# Patient Record
Sex: FEMALE | Race: WHITE | NOT HISPANIC OR LATINO | Employment: FULL TIME | URBAN - METROPOLITAN AREA
[De-identification: names, ages, dates, MRNs, and addresses within clinical notes are randomized per-mention and may not be internally consistent; named-entity substitution may affect disease eponyms.]

---

## 2020-12-03 ENCOUNTER — VIRTUAL VISIT (OUTPATIENT)
Dept: NEUROSURGERY | Facility: CLINIC | Age: 70
End: 2020-12-03
Attending: PSYCHIATRY & NEUROLOGY

## 2020-12-03 VITALS — BODY MASS INDEX: 20.77 KG/M2 | HEIGHT: 61 IN | WEIGHT: 110 LBS

## 2020-12-03 DIAGNOSIS — G43.109 MIGRAINE WITH AURA AND WITHOUT STATUS MIGRAINOSUS, NOT INTRACTABLE: ICD-10-CM

## 2020-12-03 PROCEDURE — 99203 OFFICE O/P NEW LOW 30 MIN: CPT | Mod: 95 | Performed by: PSYCHIATRY & NEUROLOGY

## 2020-12-03 RX ORDER — ERGOTAMINE TARTRATE AND CAFFEINE 1; 100 MG/1; MG/1
1 TABLET, FILM COATED ORAL
Qty: 30 TABLET | Refills: 3 | Status: SHIPPED | OUTPATIENT
Start: 2020-12-03 | End: 2021-02-03

## 2020-12-03 RX ORDER — CEFUROXIME AXETIL 250 MG/1
6 TABLET ORAL
Qty: 2 KIT | Refills: 3 | Status: SHIPPED | OUTPATIENT
Start: 2020-12-03 | End: 2021-02-03

## 2020-12-03 RX ORDER — SUMATRIPTAN 100 MG/1
100 TABLET, FILM COATED ORAL
Qty: 30 TABLET | Refills: 11 | Status: SHIPPED | OUTPATIENT
Start: 2020-12-03 | End: 2021-01-02

## 2020-12-03 ASSESSMENT — PAIN SCALES - GENERAL: PAINLEVEL: NO PAIN (0)

## 2020-12-03 ASSESSMENT — MIFFLIN-ST. JEOR: SCORE: 956.34

## 2020-12-03 NOTE — LETTER
12/3/2020         RE: Rebecca Wilson  [address]  [facility] [address]        Dear Colleague,    Thank you for referring your patient, Rebecca Wilson, to the [facility] [place]. Please see a copy of my visit note below.        [facility]  Rebecca Wilson MRN# [id number]   Age: 70 year old YOB: 1950     Requesting physician: No ref. provider found  Marquita Razo            Assessment and Plan:   Assessment:   1.  Migraine Headache disorderpain  2.  Left ankle pain  3.  History of left SI joint      Plan:  1.  Continue same medications  -Ergotamine  -Sumatriptan injections and tablets  2.  Trial of rest for left ankle    At this time we will continue her prescriptions for the sumatriptan both orally and injectable as well as renewing her oral ergotamine.  I will continue to follow her for her vascular headache disorder.  We will treat her left ankle conservatively for now and see if her symptoms resolve implicating the ankle or if they persist implicating her back.  She will call for follow-up.             History of Present Illness:   Ms. Wilson is a 70-year-old female I have followed for nearly 40 years for evaluation and management of an intractable migraine disorder.    Owing to COVID-19, this visit was conducted virtually using Doximity.  Ms. Wilson is aware of the limitations of this form of medical review and agreed to the visit in this nature.      Her history of migraine dates back to early childhood.  Her evaluation has included serial examinations which are normal, brain imaging which is normal, and observation.  Over the years we have tried each and every preventative agent available including but not limited to antidepressants, antiepileptic agents, vascular medications, and Botox.  We have had some success with episodic intervention with sumatriptan as well as ergotamine.  At times her headaches were so disruptive that  "management with narcotic analgesics and IV Depakote was administered in the emergency department.    Over the past decade, her headache disorder has seemingly calmed but she continues to experience frequent migraine aura which have not treated with sumatriptan either orally or injectable he will soon evolve into a severe disabling headache.    At present, she describes an aura of an unusual sensation on the left side of her face followed by disruption in her vision which she terms \"aura\" described as visual blurring initially in the left eye and occasionally traversing to the right as well.  Would be at that point that she would take either the sumatriptan or ergotamine when available to abort the headache.  She would be relatively free of discomfort but feels extensive fatigue for the next several hours and will by enlarge be down for the rest of the day.  She has not required any narcotic analgesics or emergency room visits for many years.    She also has a past history of significant left-sided lower back discomfort with episodic radicular complaints radiating into the left leg.  Investigation in the past has disclose this is likely representative of SI joint strain.  She indicates that this has been stable over the years however more recently has developed some left ankle pain after walking extended distances and wonders if this might be caused by her previous back difficulties.  She is not aware of any change in her back discomfort and is otherwise unaware of any radicular symptoms rating from the back into the leg.  The discomfort seems localized to the ankle which is tender to touch and accompanied by tightness in the left calf.  It does not seem to be prominent with bending or twisting or coughing or sneezing and is not associated with any weakness or any other neurological symptoms.    The balance of her neurologic review of systems is noncontributory.    Her general health is excellent her medical review " of systems as noted below.  She is about to undergo biopsy of a lesion on her left thigh.                 Physical Exam:   General Appearance:  Neurological Examination:  Cognition and Orientation: The patient is oriented to person, place and self. Normal attention and recall. No aphasia or dysarthria  General Motor Survey: The patient has normal muscle bulk, tone and strength in all four extremities. No tremor present.    Gait: Normal gait. Romberg negative. Able to walk on toes, heels and tandem walk           Data:   All laboratory data reviewed  All imaging studies reviewed by me             DATA for DOCUMENTATION:         Past Medical History:   There is no problem list on file for this patient.    Past Medical History:   Diagnosis Date     Diverticulitis      Migraine        Also see scanned health assessment forms.       Past Surgical History:     Past Surgical History:   Procedure Laterality Date     APPENDECTOMY        SECTION       COLONOSCOPY  2013    Procedure: COLONOSCOPY;  COLONOSCOPY;  Surgeon: Lali Bennett MD;  Location: [facility]     deviated septum[              Social History:     Social History     Socioeconomic History     Marital status:      Spouse name: Not on file     Number of children: Not on file     Years of education: Not on file     Highest education level: Not on file   Occupational History     Not on file   Social Needs     Financial resource strain: Not on file     Food insecurity     Worry: Not on file     Inability: Not on file     Transportation needs     Medical: Not on file     Non-medical: Not on file   Tobacco Use     Smoking status: Never Smoker     Smokeless tobacco: Never Used   Substance and Sexual Activity     Alcohol use: Yes     Comment: 2/week     Drug use: No     Sexual activity: Not on file   Lifestyle     Physical activity     Days per week: Not on file     Minutes per session: Not on file     Stress: Not on file   Relationships     Social  connections     Talks on phone: Not on file     Gets together: Not on file     Attends Buddhist service: Not on file     Active member of club or organization: Not on file     Attends meetings of clubs or organizations: Not on file     Relationship status: Not on file     Intimate partner violence     Fear of current or ex partner: Not on file     Emotionally abused: Not on file     Physically abused: Not on file     Forced sexual activity: Not on file   Other Topics Concern     Parent/sibling w/ CABG, MI or angioplasty before 65F 55M? Not Asked   Social History Narrative     Not on file              Family History:   No family history on file.         Medications:     Current Outpatient Medications   Medication Sig     ergotamine-caffeine (CAFERGOT) 2-100 MG per suppository Place 1 suppository rectally as needed for migraine     SUMAtriptan Succinate (IMITREX PO) Take 100 mg by mouth. Indications: Migraine Headache     oxyCODONE-acetaminophen (PERCOCET) 5-325 MG per tablet Take 1-2 tablets by mouth every 4 hours as needed for pain. (Patient not taking: Reported on 12/3/2020)     No current facility-administered medications for this visit.               Review of Systems:   A comprehensive 10 point review of systems (constitutional, ENT, cardiac, peripheral vascular, lymphatic, respiratory, GI, , Musculoskeletal, skin, Neurological) was performed and found to be negative except as described in this note.     See intake form completed by patient      Again, thank you for allowing me to participate in the care of your patient.        Sincerely,        Flex Sparrow MD, MD

## 2020-12-03 NOTE — NURSING NOTE
"Rebecca Wilson is a 70 year old female who is being evaluated via a billable telephone visit.      The patient has been notified of following:     \"This telephone visit will be conducted via a call between you and your physician/provider. We have found that certain health care needs can be provided without the need for a physical exam.  This service lets us provide the care you need with a short phone conversation.  If a prescription is necessary we can send it directly to your pharmacy.  If lab work is needed we can place an order for that and you can then stop by our lab to have the test done at a later time.    Telephone visits are billed at different rates depending on your insurance coverage. During this emergency period, for some insurers they may be billed the same as an in-person visit.  Please reach out to your insurance provider with any questions.    If during the course of the call the physician/provider feels a telephone visit is not appropriate, you will not be charged for this service.\"    Patient has given verbal consent for Telephone visit?  Yes    What phone number would you like to be contacted at? [phone]    How would you like to obtain your AVS? Benjamin Arrington MA on 12/3/2020 at 8:07 AM      "

## 2020-12-03 NOTE — PROGRESS NOTES
[facility]  Rebecca Wilson MRN# [id number]   Age: 70 year old YOB: 1950     Requesting physician: No ref. provider found  Marquita Razo            Assessment and Plan:   Assessment:   1.  Migraine Headache disorderpain  2.  Left ankle pain  3.  History of left SI joint      Plan:  1.  Continue same medications  -Ergotamine  -Sumatriptan injections and tablets  2.  Trial of rest for left ankle    At this time we will continue her prescriptions for the sumatriptan both orally and injectable as well as renewing her oral ergotamine.  I will continue to follow her for her vascular headache disorder.  We will treat her left ankle conservatively for now and see if her symptoms resolve implicating the ankle or if they persist implicating her back.  She will call for follow-up.             History of Present Illness:   Ms. Wilson is a 70-year-old female I have followed for nearly 40 years for evaluation and management of an intractable migraine disorder.    Owing to COVID-19, this visit was conducted virtually using Doximity.  Ms. Wilson is aware of the limitations of this form of medical review and agreed to the visit in this nature.      Her history of migraine dates back to early childhood.  Her evaluation has included serial examinations which are normal, brain imaging which is normal, and observation.  Over the years we have tried each and every preventative agent available including but not limited to antidepressants, antiepileptic agents, vascular medications, and Botox.  We have had some success with episodic intervention with sumatriptan as well as ergotamine.  At times her headaches were so disruptive that management with narcotic analgesics and IV Depakote was administered in the emergency department.    Over the past decade, her headache disorder has seemingly calmed but she continues to experience frequent migraine aura which have not treated with sumatriptan either orally or  "injectable he will soon evolve into a severe disabling headache.    At present, she describes an aura of an unusual sensation on the left side of her face followed by disruption in her vision which she terms \"aura\" described as visual blurring initially in the left eye and occasionally traversing to the right as well.  Would be at that point that she would take either the sumatriptan or ergotamine when available to abort the headache.  She would be relatively free of discomfort but feels extensive fatigue for the next several hours and will by enlarge be down for the rest of the day.  She has not required any narcotic analgesics or emergency room visits for many years.    She also has a past history of significant left-sided lower back discomfort with episodic radicular complaints radiating into the left leg.  Investigation in the past has disclose this is likely representative of SI joint strain.  She indicates that this has been stable over the years however more recently has developed some left ankle pain after walking extended distances and wonders if this might be caused by her previous back difficulties.  She is not aware of any change in her back discomfort and is otherwise unaware of any radicular symptoms rating from the back into the leg.  The discomfort seems localized to the ankle which is tender to touch and accompanied by tightness in the left calf.  It does not seem to be prominent with bending or twisting or coughing or sneezing and is not associated with any weakness or any other neurological symptoms.    The balance of her neurologic review of systems is noncontributory.    Her general health is excellent her medical review of systems as noted below.  She is about to undergo biopsy of a lesion on her left thigh.                 Physical Exam:   General Appearance:  Neurological Examination:  Cognition and Orientation: The patient is oriented to person, place and self. Normal attention and recall. " No aphasia or dysarthria  General Motor Survey: The patient has normal muscle bulk, tone and strength in all four extremities. No tremor present.    Gait: Normal gait. Romberg negative. Able to walk on toes, heels and tandem walk           Data:   All laboratory data reviewed  All imaging studies reviewed by me             DATA for DOCUMENTATION:         Past Medical History:   There is no problem list on file for this patient.    Past Medical History:   Diagnosis Date     Diverticulitis      Migraine        Also see scanned health assessment forms.       Past Surgical History:     Past Surgical History:   Procedure Laterality Date     APPENDECTOMY        SECTION       COLONOSCOPY  2013    Procedure: COLONOSCOPY;  COLONOSCOPY;  Surgeon: Lali Bennett MD;  Location: [facility]     deviated septum[              Social History:     Social History     Socioeconomic History     Marital status:      Spouse name: Not on file     Number of children: Not on file     Years of education: Not on file     Highest education level: Not on file   Occupational History     Not on file   Social Needs     Financial resource strain: Not on file     Food insecurity     Worry: Not on file     Inability: Not on file     Transportation needs     Medical: Not on file     Non-medical: Not on file   Tobacco Use     Smoking status: Never Smoker     Smokeless tobacco: Never Used   Substance and Sexual Activity     Alcohol use: Yes     Comment: 2/week     Drug use: No     Sexual activity: Not on file   Lifestyle     Physical activity     Days per week: Not on file     Minutes per session: Not on file     Stress: Not on file   Relationships     Social connections     Talks on phone: Not on file     Gets together: Not on file     Attends Yazidi service: Not on file     Active member of club or organization: Not on file     Attends meetings of clubs or organizations: Not on file     Relationship status: Not on file      Intimate partner violence     Fear of current or ex partner: Not on file     Emotionally abused: Not on file     Physically abused: Not on file     Forced sexual activity: Not on file   Other Topics Concern     Parent/sibling w/ CABG, MI or angioplasty before 65F 55M? Not Asked   Social History Narrative     Not on file              Family History:   No family history on file.         Medications:     Current Outpatient Medications   Medication Sig     ergotamine-caffeine (CAFERGOT) 2-100 MG per suppository Place 1 suppository rectally as needed for migraine     SUMAtriptan Succinate (IMITREX PO) Take 100 mg by mouth. Indications: Migraine Headache     oxyCODONE-acetaminophen (PERCOCET) 5-325 MG per tablet Take 1-2 tablets by mouth every 4 hours as needed for pain. (Patient not taking: Reported on 12/3/2020)     No current facility-administered medications for this visit.               Review of Systems:   A comprehensive 10 point review of systems (constitutional, ENT, cardiac, peripheral vascular, lymphatic, respiratory, GI, , Musculoskeletal, skin, Neurological) was performed and found to be negative except as described in this note.     See intake form completed by patient

## 2021-05-06 DIAGNOSIS — G43.109 MIGRAINE WITH AURA AND WITHOUT STATUS MIGRAINOSUS, NOT INTRACTABLE: Primary | ICD-10-CM

## 2021-05-06 RX ORDER — SUMATRIPTAN 6 MG/.5ML
6 INJECTION, SOLUTION SUBCUTANEOUS ONCE
Qty: 0.5 ML | Refills: 11 | Status: SHIPPED | OUTPATIENT
Start: 2021-05-06 | End: 2021-05-06

## 2021-07-15 DIAGNOSIS — G43.109 MIGRAINE WITH AURA AND WITHOUT STATUS MIGRAINOSUS, NOT INTRACTABLE: Primary | ICD-10-CM

## 2021-07-15 RX ORDER — SUMATRIPTAN 6 MG/.5ML
6 INJECTION, SOLUTION SUBCUTANEOUS
Qty: 0.5 ML | Refills: 11 | Status: SHIPPED | OUTPATIENT
Start: 2021-07-15 | End: 2021-08-15

## 2021-07-15 NOTE — PROGRESS NOTES
Renewing subcutaneous Imitrex - 10 doses of 6 mg (0.5 ml's) per month x 12 months.  Called [facility] after sending RX via e-prescribe and was informed that the order came through and would be filled.

## 2021-09-09 ENCOUNTER — TELEPHONE (OUTPATIENT)
Dept: NEUROLOGY | Facility: CLINIC | Age: 71
End: 2021-09-09
Payer: COMMERCIAL

## 2021-09-09 DIAGNOSIS — G43.109 MIGRAINE WITH AURA AND WITHOUT STATUS MIGRAINOSUS, NOT INTRACTABLE: Primary | ICD-10-CM

## 2021-09-09 PROCEDURE — 99207 PR NO BILLABLE SERVICE THIS VISIT: CPT | Mod: 25 | Performed by: PSYCHIATRY & NEUROLOGY

## 2021-09-09 NOTE — TELEPHONE ENCOUNTER
Spoke with Ms Wilson about worsening headaches and increasing frequency and duration of auras.  We have had relative stability with declining frequency of these issues for many years and before initiating additional treatment will obtain updated imaging and then review with her.

## 2021-09-19 ENCOUNTER — HEALTH MAINTENANCE LETTER (OUTPATIENT)
Age: 71
End: 2021-09-19

## 2021-10-17 ENCOUNTER — HOSPITAL ENCOUNTER (OUTPATIENT)
Dept: MRI IMAGING | Facility: CLINIC | Age: 71
Discharge: HOME | End: 2021-10-17
Attending: PSYCHIATRY & NEUROLOGY | Admitting: PSYCHIATRY & NEUROLOGY
Payer: COMMERCIAL

## 2021-10-17 DIAGNOSIS — G43.109 MIGRAINE WITH AURA AND WITHOUT STATUS MIGRAINOSUS, NOT INTRACTABLE: ICD-10-CM

## 2021-10-17 PROCEDURE — 70551 MRI BRAIN STEM W/O DYE: CPT

## 2022-01-10 ENCOUNTER — TELEPHONE (OUTPATIENT)
Dept: NEUROLOGY | Facility: CLINIC | Age: 72
End: 2022-01-10
Payer: COMMERCIAL

## 2022-01-10 NOTE — TELEPHONE ENCOUNTER
Last Visit:  9/9/21    Next Visit: TBD    Name of Provider:  Dr Sparrow    Assessment:    [facility] pharmacy is calling to update Dr Sparrow on the excessive refills.     -ergotamine/caffeine  Ordered 11/20/21  #30 plus 1 refill.      Filled in November and December  -sumatriptan oral  #24 tabs plus 5 refills         Filled and has used 2 refills  -sumatriptan injection  Ordered 7/15/21   1 month plus 12            refills       Refilled x 6

## 2022-10-20 DIAGNOSIS — G43.109 MIGRAINE WITH AURA AND WITHOUT STATUS MIGRAINOSUS, NOT INTRACTABLE: Primary | ICD-10-CM

## 2022-10-20 RX ORDER — SUMATRIPTAN 50 MG/1
100 TABLET, FILM COATED ORAL
Qty: 30 TABLET | Refills: 11 | Status: SHIPPED | OUTPATIENT
Start: 2022-10-20 | End: 2022-11-19

## 2022-10-20 RX ORDER — CEFUROXIME AXETIL 250 MG/1
6 TABLET ORAL
Qty: 6 ML | Refills: 11 | Status: SHIPPED | OUTPATIENT
Start: 2022-10-20 | End: 2022-11-19

## 2022-10-20 RX ORDER — SUMATRIPTAN 50 MG/1
100 TABLET, FILM COATED ORAL
Qty: 9 TABLET | Refills: 11 | Status: SHIPPED | OUTPATIENT
Start: 2022-10-20 | End: 2022-10-20

## 2022-11-20 ENCOUNTER — HEALTH MAINTENANCE LETTER (OUTPATIENT)
Age: 72
End: 2022-11-20

## 2022-12-26 ENCOUNTER — TELEPHONE (OUTPATIENT)
Dept: NEUROLOGY | Facility: CLINIC | Age: 72
End: 2022-12-26

## 2022-12-26 NOTE — TELEPHONE ENCOUNTER
Sumatriptan refill request faxed to our clinic. Pt already has 6 refills left and available, contacted pharmacy and medication is ready for pickup. Pt want meds sent to location in [facility] and not [place].        Steph Cadena MA

## 2023-01-19 DIAGNOSIS — G43.109 MIGRAINE WITH AURA AND WITHOUT STATUS MIGRAINOSUS, NOT INTRACTABLE: Primary | ICD-10-CM

## 2023-01-19 RX ORDER — SUMATRIPTAN 100 MG/1
100 TABLET, FILM COATED ORAL
Qty: 24 TABLET | Refills: 11 | Status: SHIPPED | OUTPATIENT
Start: 2023-01-19 | End: 2023-02-19

## 2023-02-02 DIAGNOSIS — G43.109 MIGRAINE WITH AURA AND WITHOUT STATUS MIGRAINOSUS, NOT INTRACTABLE: ICD-10-CM

## 2023-02-02 NOTE — TELEPHONE ENCOUNTER
Pending Prescriptions:                       Disp   Refills    SUMAtriptan (IMITREX) 100 MG tablet       24 tab*11           Sig: Take 1 tablet (100 mg) by mouth at onset of           headache for migraine    Last Appt: 12/03/2020  Next Appt: None

## 2023-02-03 RX ORDER — SUMATRIPTAN 100 MG/1
100 TABLET, FILM COATED ORAL
Qty: 24 TABLET | Refills: 11 | OUTPATIENT
Start: 2023-02-03

## 2023-02-09 ENCOUNTER — VIRTUAL VISIT (OUTPATIENT)
Dept: NEUROLOGY | Facility: CLINIC | Age: 73
End: 2023-02-09
Payer: MEDICARE

## 2023-02-09 VITALS — WEIGHT: 106 LBS | BODY MASS INDEX: 20.03 KG/M2

## 2023-02-09 DIAGNOSIS — M54.2 NECK PAIN: Primary | ICD-10-CM

## 2023-02-09 PROCEDURE — 99441 PR PHYSICIAN TELEPHONE EVALUATION 5-10 MIN: CPT | Mod: 95 | Performed by: PSYCHIATRY & NEUROLOGY

## 2023-02-09 NOTE — LETTER
2/9/2023         RE: Rebecca Wilson  [address]  [facility] [address]        Dear Colleague,    Thank you for referring your patient, Rebecca Wilson, to the [facility]. Please see a copy of my visit note below.        [facility]    Rebecca Wilson MRN# [id number]   Age: 72 year old YOB: 1950     Requesting physician: No ref. provider found  Marquita Razo            Assessment and Plan:   Assessment:  1.  Neck pain - x 2 months - probably musculoskeletal     Plan:  1.  Will refer to PT for trial of mechanical intervention  2.  Call or return if unsuccessful    Rebecca's difficulties appear to be of musculoskeletal origin.  She has profited from the physical therapy which I previously recommended and also suggested that she continue with this until her difficulties have either resolved or she is transferred to a self maintenance exercise program.             History of Present Illness:   CC:  10-minute telephone visit with Ms. Wilson was held today at her request to discuss her difficulties with neck pain.  I have followed her over several decades for issues with migraine headaches which are at present generally well managed with parenteral and oral sumatriptan on an as-needed basis.  Today's discussion however dealt instead with a new symptom, that of bilateral axial neck discomfort.  This emerged shortly after the death of her .  We did have a brief discussion at that time and her symptoms sounded to be more of a musculoskeletal issue than any neurological impairment.  At that time I had suggested she seek consultation with physical therapy.  She did see her therapist, Mr. Aditya Law who has treated her on several occasions with improvement in her difficulty.  We met today via telephone/virtual visit in follow-up.    She continues to experience neck discomfort although finds that the physical therapy interventions have  provided her with some benefit.  The pain is localized to the cervical spine and does not radiate out into the arms.  There are no radicular symptoms no associated weakness, numbness or tingling, change with head movement, cough or sneezing.  The balance of her neurologic review of systems is noncontributory.               Physical Exam:   Not performed via telephone visit.         Data:   All laboratory data reviewed  All imaging studies reviewed by me             DATA for DOCUMENTATION:         Past Medical History:     Patient Active Problem List   Diagnosis     Migraine with aura and without status migrainosus, not intractable     Past Medical History:   Diagnosis Date     Diverticulitis      Migraine        Also see scanned health assessment forms.       Past Surgical History:     Past Surgical History:   Procedure Laterality Date     APPENDECTOMY        SECTION       COLONOSCOPY  2013    Procedure: COLONOSCOPY;  COLONOSCOPY;  Surgeon: Lali Bennett MD;  Location: [facility]     deviated septum[              Social History:     Social History     Socioeconomic History     Marital status:      Spouse name: Not on file     Number of children: Not on file     Years of education: Not on file     Highest education level: Not on file   Occupational History     Not on file   Tobacco Use     Smoking status: Never     Smokeless tobacco: Never   Substance and Sexual Activity     Alcohol use: Yes     Comment: 2/week     Drug use: No     Sexual activity: Not on file   Other Topics Concern     Parent/sibling w/ CABG, MI or angioplasty before 65F 55M? Not Asked   Social History Narrative     Not on file     Social Determinants of Health     Financial Resource Strain: Not on file   Food Insecurity: Not on file   Transportation Needs: Not on file   Physical Activity: Not on file   Stress: Not on file   Social Connections: Not on file   Intimate Partner Violence: Not on file   Housing Stability: Not on  file              Family History:   No family history on file.         Medications:     Current Outpatient Medications   Medication Sig     ergotamine-caffeine (CAFERGOT) 2-100 MG per suppository Place 1 suppository rectally as needed for migraine     SUMAtriptan (IMITREX) 100 MG tablet Take 1 tablet (100 mg) by mouth at onset of headache for migraine     oxyCODONE-acetaminophen (PERCOCET) 5-325 MG per tablet Take 1-2 tablets by mouth every 4 hours as needed for pain. (Patient not taking: Reported on 12/3/2020)     No current facility-administered medications for this visit.              Review of Systems:   A comprehensive 10 point review of systems (constitutional, ENT, cardiac, peripheral vascular, lymphatic, respiratory, GI, , Musculoskeletal, skin, Neurological) was performed and found to be negative except as described in this note.     See intake form completed by patient        Again, thank you for allowing me to participate in the care of your patient.        Sincerely,        Flex Sparrow MD, MD

## 2023-02-09 NOTE — NURSING NOTE
"Rebecca Wilson is a 72 year old female who presents for:  Chief Complaint   Patient presents with     Consult For     Neck pain        Initial Vitals:  Wt 48.1 kg (106 lb)   BMI 20.03 kg/m   Estimated body mass index is 20.03 kg/m  as calculated from the following:    Height as of 12/3/20: 1.549 m (5' 1\").    Weight as of this encounter: 48.1 kg (106 lb).. Body surface area is 1.44 meters squared. BP completed using cuff size: NA (Not Taken)    Nursing Comments:     Felicita Ghosh    "

## 2023-02-09 NOTE — PROGRESS NOTES
[facility]    Rebecca Wilson MRN# [id number]   Age: 72 year old YOB: 1950     Requesting physician: No ref. provider found  Marquita Razo            Assessment and Plan:   Assessment:  1.  Neck pain - x 2 months - probably musculoskeletal     Plan:  1.  Will refer to PT for trial of mechanical intervention  2.  Call or return if unsuccessful    Rebecca's difficulties appear to be of musculoskeletal origin.  She has profited from the physical therapy which I previously recommended and also suggested that she continue with this until her difficulties have either resolved or she is transferred to a self maintenance exercise program.             History of Present Illness:   CC:  10-minute telephone visit with Ms. Wilson was held today at her request to discuss her difficulties with neck pain.  I was in my [place] office and she was away on vacation in [place], [place].  I was responding to a prior phone call from her pertaining to a new problem of neck pain.      I have followed her over several decades for issues with migraine headaches which are at present generally well managed with parenteral and oral sumatriptan on an as-needed basis.  Today's discussion however dealt instead with a new symptom, that of bilateral axial neck discomfort.  This emerged shortly after the death of her .  We did have a brief discussion at that time and her symptoms sounded to be more of a musculoskeletal issue than any neurological impairment.  At that time I had suggested she seek consultation with physical therapy.  She did see her therapist, Mr. Aditya Law who has treated her on several occasions with improvement in her difficulty.  We met today via telephone/virtual visit in follow-up.    She continues to experience neck discomfort although finds that the physical therapy interventions have provided her with some benefit.  The pain is localized to the cervical spine and does not radiate out  into the arms.  There are no radicular symptoms no associated weakness, numbness or tingling, change with head movement, cough or sneezing.  The balance of her neurologic review of systems is noncontributory.               Physical Exam:   Not performed via telephone visit.         Data:   All laboratory data reviewed  All imaging studies reviewed by me             DATA for DOCUMENTATION:         Past Medical History:     Patient Active Problem List   Diagnosis     Migraine with aura and without status migrainosus, not intractable     Past Medical History:   Diagnosis Date     Diverticulitis      Migraine        Also see scanned health assessment forms.       Past Surgical History:     Past Surgical History:   Procedure Laterality Date     APPENDECTOMY        SECTION       COLONOSCOPY  2013    Procedure: COLONOSCOPY;  COLONOSCOPY;  Surgeon: Lali Bennett MD;  Location: [facility]     deviated septum[              Social History:     Social History     Socioeconomic History     Marital status:      Spouse name: Not on file     Number of children: Not on file     Years of education: Not on file     Highest education level: Not on file   Occupational History     Not on file   Tobacco Use     Smoking status: Never     Smokeless tobacco: Never   Substance and Sexual Activity     Alcohol use: Yes     Comment: 2/week     Drug use: No     Sexual activity: Not on file   Other Topics Concern     Parent/sibling w/ CABG, MI or angioplasty before 65F 55M? Not Asked   Social History Narrative     Not on file     Social Determinants of Health     Financial Resource Strain: Not on file   Food Insecurity: Not on file   Transportation Needs: Not on file   Physical Activity: Not on file   Stress: Not on file   Social Connections: Not on file   Intimate Partner Violence: Not on file   Housing Stability: Not on file              Family History:   No family history on file.         Medications:     Current  Outpatient Medications   Medication Sig     ergotamine-caffeine (CAFERGOT) 2-100 MG per suppository Place 1 suppository rectally as needed for migraine     SUMAtriptan (IMITREX) 100 MG tablet Take 1 tablet (100 mg) by mouth at onset of headache for migraine     oxyCODONE-acetaminophen (PERCOCET) 5-325 MG per tablet Take 1-2 tablets by mouth every 4 hours as needed for pain. (Patient not taking: Reported on 12/3/2020)     No current facility-administered medications for this visit.              Review of Systems:   A comprehensive 10 point review of systems (constitutional, ENT, cardiac, peripheral vascular, lymphatic, respiratory, GI, , Musculoskeletal, skin, Neurological) was performed and found to be negative except as described in this note.     See intake form completed by patient

## 2023-04-15 ENCOUNTER — HEALTH MAINTENANCE LETTER (OUTPATIENT)
Age: 73
End: 2023-04-15

## 2023-07-06 DIAGNOSIS — G43.109 MIGRAINE WITH AURA AND WITHOUT STATUS MIGRAINOSUS, NOT INTRACTABLE: Primary | ICD-10-CM

## 2023-10-23 RX ORDER — SUMATRIPTAN 50 MG/1
TABLET, FILM COATED ORAL
Qty: 18 TABLET | Refills: 14 | OUTPATIENT
Start: 2023-10-23

## 2023-10-26 DIAGNOSIS — G43.109 MIGRAINE WITH AURA AND WITHOUT STATUS MIGRAINOSUS, NOT INTRACTABLE: ICD-10-CM

## 2023-10-26 DIAGNOSIS — G43.109 MIGRAINE WITH AURA AND WITHOUT STATUS MIGRAINOSUS, NOT INTRACTABLE: Primary | ICD-10-CM

## 2023-10-26 RX ORDER — SUMATRIPTAN 50 MG/1
100 TABLET, FILM COATED ORAL
Qty: 36 TABLET | Refills: 3 | Status: SHIPPED | OUTPATIENT
Start: 2023-10-26 | End: 2023-10-26

## 2023-10-26 RX ORDER — SUMATRIPTAN 6 MG/.5ML
INJECTION, SOLUTION SUBCUTANEOUS
Qty: 10 ML | Refills: 11 | Status: SHIPPED | OUTPATIENT
Start: 2023-10-26 | End: 2024-07-26

## 2023-10-26 RX ORDER — SUMATRIPTAN 50 MG/1
100 TABLET, FILM COATED ORAL
Qty: 30 TABLET | Refills: 11 | Status: SHIPPED | OUTPATIENT
Start: 2023-10-26 | End: 2023-11-02

## 2023-11-02 DIAGNOSIS — G43.109 MIGRAINE WITH AURA AND WITHOUT STATUS MIGRAINOSUS, NOT INTRACTABLE: Primary | ICD-10-CM

## 2023-11-02 RX ORDER — SUMATRIPTAN 50 MG/1
100 TABLET, FILM COATED ORAL
Qty: 30 TABLET | Refills: 11 | Status: SHIPPED | OUTPATIENT
Start: 2023-11-02 | End: 2024-07-26

## 2023-11-09 RX ORDER — CEFUROXIME AXETIL 250 MG/1
TABLET ORAL
Refills: 5 | OUTPATIENT
Start: 2023-11-09

## 2023-11-09 NOTE — TELEPHONE ENCOUNTER
Per provider.  Pt has printed scripts of Sumatriptan injectable.      Sumatriptan tablet script was sent to [facility].     Emily COMBS RN, BSN  [facility]

## 2023-11-25 ENCOUNTER — HEALTH MAINTENANCE LETTER (OUTPATIENT)
Age: 73
End: 2023-11-25

## 2023-11-28 ENCOUNTER — LAB REQUISITION (OUTPATIENT)
Dept: LAB | Facility: CLINIC | Age: 73
End: 2023-11-28
Payer: MEDICARE

## 2023-11-28 DIAGNOSIS — D48.5 NEOPLASM OF UNCERTAIN BEHAVIOR OF SKIN: ICD-10-CM

## 2023-11-28 PROCEDURE — 88305 TISSUE EXAM BY PATHOLOGIST: CPT | Mod: TC,ORL | Performed by: DERMATOLOGY

## 2023-11-28 PROCEDURE — 88305 TISSUE EXAM BY PATHOLOGIST: CPT | Mod: 26 | Performed by: DERMATOLOGY

## 2023-12-01 LAB
PATH REPORT.COMMENTS IMP SPEC: ABNORMAL
PATH REPORT.COMMENTS IMP SPEC: ABNORMAL
PATH REPORT.COMMENTS IMP SPEC: YES
PATH REPORT.FINAL DX SPEC: ABNORMAL
PATH REPORT.GROSS SPEC: ABNORMAL
PATH REPORT.MICROSCOPIC SPEC OTHER STN: ABNORMAL
PATH REPORT.RELEVANT HX SPEC: ABNORMAL

## 2024-01-22 ENCOUNTER — LAB REQUISITION (OUTPATIENT)
Dept: LAB | Facility: CLINIC | Age: 74
End: 2024-01-22
Payer: MEDICARE

## 2024-01-22 DIAGNOSIS — C44.519 BASAL CELL CARCINOMA OF SKIN OF OTHER PART OF TRUNK: ICD-10-CM

## 2024-01-22 PROCEDURE — 88305 TISSUE EXAM BY PATHOLOGIST: CPT | Mod: TC,ORL | Performed by: DERMATOLOGY

## 2024-01-22 PROCEDURE — 88305 TISSUE EXAM BY PATHOLOGIST: CPT | Mod: 26 | Performed by: DERMATOLOGY

## 2024-01-24 LAB
PATH REPORT.COMMENTS IMP SPEC: NORMAL
PATH REPORT.COMMENTS IMP SPEC: NORMAL
PATH REPORT.FINAL DX SPEC: NORMAL
PATH REPORT.GROSS SPEC: NORMAL
PATH REPORT.MICROSCOPIC SPEC OTHER STN: NORMAL
PATH REPORT.RELEVANT HX SPEC: NORMAL

## 2024-05-28 ENCOUNTER — LAB REQUISITION (OUTPATIENT)
Dept: LAB | Facility: CLINIC | Age: 74
End: 2024-05-28
Payer: MEDICARE

## 2024-05-28 DIAGNOSIS — D48.5 NEOPLASM OF UNCERTAIN BEHAVIOR OF SKIN: ICD-10-CM

## 2024-05-28 PROCEDURE — 88305 TISSUE EXAM BY PATHOLOGIST: CPT | Mod: TC,ORL | Performed by: DERMATOLOGY

## 2024-05-28 PROCEDURE — 88305 TISSUE EXAM BY PATHOLOGIST: CPT | Mod: 26 | Performed by: DERMATOLOGY

## 2024-05-30 LAB
PATH REPORT.COMMENTS IMP SPEC: ABNORMAL
PATH REPORT.COMMENTS IMP SPEC: YES
PATH REPORT.FINAL DX SPEC: ABNORMAL
PATH REPORT.GROSS SPEC: ABNORMAL
PATH REPORT.MICROSCOPIC SPEC OTHER STN: ABNORMAL
PATH REPORT.RELEVANT HX SPEC: ABNORMAL

## 2024-07-03 ENCOUNTER — LAB REQUISITION (OUTPATIENT)
Dept: LAB | Facility: CLINIC | Age: 74
End: 2024-07-03
Payer: MEDICARE

## 2024-07-03 DIAGNOSIS — D04.71 CARCINOMA IN SITU OF SKIN OF RIGHT LOWER LIMB, INCLUDING HIP: ICD-10-CM

## 2024-07-03 PROCEDURE — 88305 TISSUE EXAM BY PATHOLOGIST: CPT | Mod: TC,ORL | Performed by: DERMATOLOGY

## 2024-07-03 PROCEDURE — 88305 TISSUE EXAM BY PATHOLOGIST: CPT | Mod: 26 | Performed by: DERMATOLOGY

## 2024-07-21 ASSESSMENT — HEADACHE IMPACT TEST (HIT 6)
HOW OFTEN HAVE YOU FELT FED UP OR IRRITATED BECAUSE OF YOUR HEADACHES: SOMETIMES
HOW OFTEN HAVE YOU FELT TOO TIRED TO WORK BECAUSE OF YOUR HEADACHES: SOMETIMES
WHEN YOU HAVE A HEADACHE HOW OFTEN DO YOU WISH YOU COULD LIE DOWN: SOMETIMES
HIT6 TOTAL SCORE: 58
HOW OFTEN DO HEADACHES LIMIT YOUR DAILY ACTIVITIES: SOMETIMES
WHEN YOU HAVE HEADACHES HOW OFTEN IS THE PAIN SEVERE: RARELY
HOW OFTEN DID HEADACHS LIMIT CONCENTRATION ON WORK OR DAILY ACTIVITY: SOMETIMES

## 2024-07-25 ASSESSMENT — MIGRAINE DISABILITY ASSESSMENT (MIDAS)
HOW MANY DAYS IN THE PAST 3 MONTHS HAVE YOU HAD A HEADACHE: 6
TOTAL SCORE: 77
HOW MANY DAYS WAS YOUR PRODUCTIVITY CUT IN HALF BECAUSE OF HEADACHES: 18
HOW MANY DAYS DID YOU MISS WORK OR SCHOOL BECAUSE OF HEADACHES: 18
HOW MANY DAYS WAS HOUSEWORK PRODUCTIVITY CUT IN HALF DUE TO HEADACHES: 20
HOW OFTEN WERE SOCIAL ACTIVITIES MISSED DUE TO HEADACHES: 3
HOW MANY DAYS DID YOU NOT DO HOUSEWORK BECAUSE OF HEADACHES: 18

## 2024-07-26 ENCOUNTER — OFFICE VISIT (OUTPATIENT)
Dept: NEUROLOGY | Facility: CLINIC | Age: 74
End: 2024-07-26
Payer: MEDICARE

## 2024-07-26 VITALS
SYSTOLIC BLOOD PRESSURE: 121 MMHG | HEIGHT: 61 IN | DIASTOLIC BLOOD PRESSURE: 73 MMHG | BODY MASS INDEX: 19.45 KG/M2 | HEART RATE: 52 BPM | OXYGEN SATURATION: 98 % | WEIGHT: 103 LBS

## 2024-07-26 DIAGNOSIS — G43.109 MIGRAINE WITH AURA AND WITHOUT STATUS MIGRAINOSUS, NOT INTRACTABLE: ICD-10-CM

## 2024-07-26 PROCEDURE — 99204 OFFICE O/P NEW MOD 45 MIN: CPT | Performed by: PSYCHIATRY & NEUROLOGY

## 2024-07-26 RX ORDER — SUMATRIPTAN 50 MG/1
100 TABLET, FILM COATED ORAL
Qty: 30 TABLET | Refills: 11 | Status: SHIPPED | OUTPATIENT
Start: 2024-07-26

## 2024-07-26 RX ORDER — SUMATRIPTAN 6 MG/.5ML
INJECTION, SOLUTION SUBCUTANEOUS
Qty: 10 ML | Refills: 11 | Status: SHIPPED | OUTPATIENT
Start: 2024-07-26

## 2024-07-26 RX ORDER — FREMANEZUMAB-VFRM 225 MG/1.5ML
1.5 INJECTION SUBCUTANEOUS
Qty: 1.5 ML | Refills: 11 | Status: SHIPPED | OUTPATIENT
Start: 2024-07-26

## 2024-07-26 NOTE — PROGRESS NOTES
[facility]   Headache Neurology Consult  July 26, 2024     Rebecca Wilson MRN# [id number]   YOB: 1950 Age: 74 year old     Requesting physician: Flex Sparrow MD         Assessment and Recommendations:     Rebecca Wilson is a 74 year old female who presents for further evaluation of headache.    Her headache presentation meets criteria for chronic migraine with visual and sensory aura.  I suspect she has this on a genetic basis.    Her neurologic examination is intact today.  She has had previous head imaging, an MRI of the brain in 2021, which was reviewed.  I did not recommend further evaluation for secondary causes of headache today.    We discussed a symptomatic treatment strategy, with acute and preventative treatments.    -For acute treatment, she will continue sumatriptan 50 mg tablet at the onset of headache, with a repeat dose in 2 hours if needed.  This should not exceed more than 9 days/month to avoid medication overuse.  She is currently taking this more than 9 days/month, and while she appears to be tolerating this well, we discussed trialing newer preventative therapies to see if this would be useful in limiting her acute medication use.    Her migraine symptom frequency and severity warrant prevention.    -For preventative treatment, she has tried antidepressants, antiseizure, and antihypertensive medications.  She also trialed botulinum toxin injections cosmetically.  None of these treatments was effective.    -I recommended trial of Ajovy subcutaneous injection every 30 days.  Potential side effects were discussed.  I recommend a 3 to 6-month trial prior to determining effectiveness.  -If Ajovy is not tolerated or not effective, an alternative CGRP inhibitor, or a trial of botulinum toxin injections using a chronic migraine protocol will be considered.    I will plan to see her back in 3 to 6 months to monitor her progress.    Kathleen Horton,  "MD  Neurology            Chief Complaint:     Chief Complaint   Patient presents with    Headache     Former [facility] patient            History is obtained from the patient and medical record.      Rebecca Wilson is a 74 year old female who has been living with headaches since age 5.  They changed over time, becoming now, she has more aura symptoms and less severe headache pain.  Better after menopause.     Aura is described as 20 minutes of visual disturbance that occurs on the left visual field.  This is associated with left facial tingling in her cheek.  Afterward, this completely resolves and is followed by significant fatigue and feeling wiped out for the rest of the day.    Headache feels like a \"thick\" feeling all over her head. This is less significant than in the past, but still occurs with some attacks, about weekly.    She has associated photophobia, phonophobia. No nausea/vomiting since menopause.    Visual aura occurs over left side. She also gets tingling in her left cheek.     She has 30/30 migraine symptom days per month.    No other associated neurologic deficits.  No illness associated with headache.      For treatment, sumatriptan 50 mg tablet and injection are effective.  -She uses injection about once monthly, when they won't go away. She uses sumatriptan 50 mg tablets about daily.    She has previously trialed numerous preventative treatments.  She describes trials of antidepressants, antiseizure medicines, antihypertensive medications, and has tried botulinum toxin injections cosmetically without headache benefit.    Previous notes from neurologist Dr. Flex Sparrow were reviewed.            Past Medical History:     Past Medical History:   Diagnosis Date    Diverticulitis     Migraine               Past Surgical History:     Past Surgical History:   Procedure Laterality Date    APPENDECTOMY       SECTION      COLONOSCOPY  2013    Procedure: COLONOSCOPY;  COLONOSCOPY;  " Surgeon: Lali Bennett MD;  Location:  GI    deviated septum[               Social History:   She is a , currently in a relationship.    She remains active physically and socially.    Social History     Socioeconomic History    Marital status:      Spouse name: Not on file    Number of children: Not on file    Years of education: Not on file    Highest education level: Not on file   Occupational History    Not on file   Tobacco Use    Smoking status: Never    Smokeless tobacco: Never   Substance and Sexual Activity    Alcohol use: Yes     Comment: 2/week    Drug use: No    Sexual activity: Not on file   Other Topics Concern    Parent/sibling w/ CABG, MI or angioplasty before 65F 55M? Not Asked   Social History Narrative    Not on file     Social Determinants of Health     Financial Resource Strain: High Risk (1/1/2022)    Received from [facility]    Financial Resource Strain     Difficulty of Paying Living Expenses: Not on file     Difficulty of Paying Living Expenses: Not on file   Food Insecurity: Not on file   Transportation Needs: Not on file   Physical Activity: Not on file   Stress: Not on file   Social Connections: Unknown (1/1/2022)    Received from [facility]    Social Connections     Frequency of Communication with Friends and Family: Not on file   Interpersonal Safety: Not on file   Housing Stability: Not on file             Family History:   There is a family history of migraine.          Allergies:    No Known Allergies          Medications:     Current Outpatient Medications:     ROSUVASTATIN CALCIUM PO, , Disp: , Rfl:     SUMAtriptan (IMITREX) 50 MG tablet, Take 2 tablets (100 mg) by mouth at onset of headache for migraine May repeat in 2 hours. Max 4 tablets/24 hours., Disp: 30 tablet, Rfl: 11    SUMAtriptan (IMITREX) 6 MG/0.5ML injection, Inject at onset of migraine.  May repeat dose in 1 hour. Max 12 mg/24  "hours.  Dispense 10 injections per month x 12 months., Disp: 10 mL, Rfl: 11          Physical Exam:   /73   Pulse 52   Ht 1.549 m (5' 1\")   Wt 46.7 kg (103 lb)   SpO2 98%   BMI 19.46 kg/m     Physical Exam:   General: NAD  Neurologic:   Mental Status Exam: Alert, awake and oriented to situation. No dysarthria. Speech of normal fluency.   Cranial Nerves: Pupils equal, EOMs intact, no nystagmus, facial movements symmetric, hearing intact to conversation, trapezius and SCMs 5/5 bilaterally, tongue midline and fully mobile. No tongue atrophy or fasciculations.    Motor: Normal tone in all four extremities, no atrophy or fasciculations. 5/5 strength bilaterally in upper and lower, extremities distal and proximal. No tremors or abnormal movements noted.   Sensory: Sensation intact to light touch on arms and legs bilaterally.    Coordination: Finger-nose-finger intact bilaterally, accurate on second try with left hand.  Rapidly alternating movements intact bilaterally in the upper extremities.  Normal finger tapping bilaterally.  Normal Romberg.   Reflexes: 2+ and symmetric in triceps, biceps, brachioradialis, patellar, Achilles.   Gait: Normal gait.  Able to toe and heel walk.  Tandem gait normal.  Head: Normocephalic, atraumatic.  Neck: Normal range of motion with lateral head movements and neck flexion.  Eyes: No conjunctival injection, no scleral icterus.           Data:     MRI brain wo contrast (10/17/2021):  1.  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  2.  Scattered nonspecific T2/FLAIR hyperintensities within the cerebral white matter, most consistent with mild chronic microvascular ischemic change commensurate with age.    "

## 2024-07-26 NOTE — LETTER
7/26/2024      Rebecca Wilson  [address]  [facility] [address]      Dear Colleague,    Thank you for referring your patient, Rebecca Wilson, to the [facility]. Please see a copy of my visit note below.    [facility]   Headache Neurology Consult  July 26, 2024     Rebecca Wilson MRN# [id number]   YOB: 1950 Age: 74 year old     Requesting physician: Flex Sparrow MD         Assessment and Recommendations:     Rebecca Wilson is a 74 year old female who presents for further evaluation of headache.    Her headache presentation meets criteria for chronic migraine with visual and sensory aura.  I suspect she has this on a genetic basis.    Her neurologic examination is intact today.  She has had previous head imaging, an MRI of the brain in 2021, which was reviewed.  I did not recommend further evaluation for secondary causes of headache today.    We discussed a symptomatic treatment strategy, with acute and preventative treatments.    -For acute treatment, she will continue sumatriptan 50 mg tablet at the onset of headache, with a repeat dose in 2 hours if needed.  This should not exceed more than 9 days/month to avoid medication overuse.  She is currently taking this more than 9 days/month, and while she appears to be tolerating this well, we discussed trialing newer preventative therapies to see if this would be useful in limiting her acute medication use.    Her migraine symptom frequency and severity warrant prevention.    -For preventative treatment, she has tried antidepressants, antiseizure, and antihypertensive medications.  She also trialed botulinum toxin injections cosmetically.  None of these treatments was effective.    -I recommended trial of Ajovy subcutaneous injection every 30 days.  Potential side effects were discussed.  I recommend a 3 to 6-month trial prior to determining effectiveness.  -If  "Ajovy is not tolerated or not effective, an alternative CGRP inhibitor, or a trial of botulinum toxin injections using a chronic migraine protocol will be considered.    I will plan to see her back in 3 to 6 months to monitor her progress.    Kathleen Horton MD  Neurology            Chief Complaint:     Chief Complaint   Patient presents with     Headache     Former [facility] patient            History is obtained from the patient and medical record.      Rebecca Wilson is a 74 year old female who has been living with headaches since age 5.  They changed over time, becoming now, she has more aura symptoms and less severe headache pain.  Better after menopause.     Aura is described as 20 minutes of visual disturbance that occurs on the left visual field.  This is associated with left facial tingling in her cheek.  Afterward, this completely resolves and is followed by significant fatigue and feeling wiped out for the rest of the day.    Headache feels like a \"thick\" feeling all over her head. This is less significant than in the past, but still occurs with some attacks, about weekly.    She has associated photophobia, phonophobia. No nausea/vomiting since menopause.    Visual aura occurs over left side. She also gets tingling in her left cheek.     She has 30/30 migraine symptom days per month.    No other associated neurologic deficits.  No illness associated with headache.      For treatment, sumatriptan 50 mg tablet and injection are effective.  -She uses injection about once monthly, when they won't go away. She uses sumatriptan 50 mg tablets about daily.    She has previously trialed numerous preventative treatments.  She describes trials of antidepressants, antiseizure medicines, antihypertensive medications, and has tried botulinum toxin injections cosmetically without headache benefit.    Previous notes from neurologist Dr. Flex Sparrow were reviewed.            Past Medical History:     Past Medical " History:   Diagnosis Date     Diverticulitis      Migraine               Past Surgical History:     Past Surgical History:   Procedure Laterality Date     APPENDECTOMY        SECTION       COLONOSCOPY  2013    Procedure: COLONOSCOPY;  COLONOSCOPY;  Surgeon: Lali Bennett MD;  Location: [facility]     deviated septum[               Social History:   She is a , currently in a relationship.    She remains active physically and socially.    Social History     Socioeconomic History     Marital status:      Spouse name: Not on file     Number of children: Not on file     Years of education: Not on file     Highest education level: Not on file   Occupational History     Not on file   Tobacco Use     Smoking status: Never     Smokeless tobacco: Never   Substance and Sexual Activity     Alcohol use: Yes     Comment: 2/week     Drug use: No     Sexual activity: Not on file   Other Topics Concern     Parent/sibling w/ CABG, MI or angioplasty before 65F 55M? Not Asked   Social History Narrative     Not on file     Social Determinants of Health     Financial Resource Strain: High Risk (2022)    Received from [facility]    Financial Resource Strain      Difficulty of Paying Living Expenses: Not on file      Difficulty of Paying Living Expenses: Not on file   Food Insecurity: Not on file   Transportation Needs: Not on file   Physical Activity: Not on file   Stress: Not on file   Social Connections: Unknown (2022)    Received from [facility]    Social Connections      Frequency of Communication with Friends and Family: Not on file   Interpersonal Safety: Not on file   Housing Stability: Not on file             Family History:   There is a family history of migraine.          Allergies:    No Known Allergies          Medications:     Current Outpatient Medications:      ROSUVASTATIN CALCIUM PO, , Disp: , Rfl:      SUMAtriptan  "(IMITREX) 50 MG tablet, Take 2 tablets (100 mg) by mouth at onset of headache for migraine May repeat in 2 hours. Max 4 tablets/24 hours., Disp: 30 tablet, Rfl: 11     SUMAtriptan (IMITREX) 6 MG/0.5ML injection, Inject at onset of migraine.  May repeat dose in 1 hour. Max 12 mg/24 hours.  Dispense 10 injections per month x 12 months., Disp: 10 mL, Rfl: 11          Physical Exam:   /73   Pulse 52   Ht 1.549 m (5' 1\")   Wt 46.7 kg (103 lb)   SpO2 98%   BMI 19.46 kg/m     Physical Exam:   General: NAD  Neurologic:   Mental Status Exam: Alert, awake and oriented to situation. No dysarthria. Speech of normal fluency.   Cranial Nerves: Pupils equal, EOMs intact, no nystagmus, facial movements symmetric, hearing intact to conversation, trapezius and SCMs 5/5 bilaterally, tongue midline and fully mobile. No tongue atrophy or fasciculations.    Motor: Normal tone in all four extremities, no atrophy or fasciculations. 5/5 strength bilaterally in upper and lower, extremities distal and proximal. No tremors or abnormal movements noted.   Sensory: Sensation intact to light touch on arms and legs bilaterally.    Coordination: Finger-nose-finger intact bilaterally, accurate on second try with left hand.  Rapidly alternating movements intact bilaterally in the upper extremities.  Normal finger tapping bilaterally.  Normal Romberg.   Reflexes: 2+ and symmetric in triceps, biceps, brachioradialis, patellar, Achilles.   Gait: Normal gait.  Able to toe and heel walk.  Tandem gait normal.  Head: Normocephalic, atraumatic.  Neck: Normal range of motion with lateral head movements and neck flexion.  Eyes: No conjunctival injection, no scleral icterus.           Data:     MRI brain wo contrast (10/17/2021):  1.  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  2.  Scattered nonspecific T2/FLAIR hyperintensities within the cerebral white matter, most consistent with mild chronic microvascular ischemic change commensurate with " age.      Again, thank you for allowing me to participate in the care of your patient.        Sincerely,        Kathleen Horton MD

## 2024-07-26 NOTE — NURSING NOTE
"Rebecca Wilson is a 74 year old female who presents for:  Chief Complaint   Patient presents with    Headache     Former [facility] patient         Initial Vitals:  Pulse 52   Ht 1.549 m (5' 1\")   Wt 46.7 kg (103 lb)   SpO2 98%   BMI 19.46 kg/m   Estimated body mass index is 19.46 kg/m  as calculated from the following:    Height as of this encounter: 1.549 m (5' 1\").    Weight as of this encounter: 46.7 kg (103 lb).. Body surface area is 1.42 meters squared. BP completed using cuff size: shelby Robles CMA    "

## 2024-07-29 ENCOUNTER — TELEPHONE (OUTPATIENT)
Dept: NEUROLOGY | Facility: CLINIC | Age: 74
End: 2024-07-29
Payer: MEDICARE

## 2024-07-29 NOTE — TELEPHONE ENCOUNTER
Prior Authorization Specialty Medication Request    Medication/Dose: Fremanezumab-vfrm (AJOVY) 225 MG/1.5ML SOAJ   Diagnosis and ICD code (if different than what is on RX):    New/renewal/insurance change PA/secondary ins. PA:  Previously Tried and Failed:      Important Lab Values:   Rationale:     Insurance   Primary:   Insurance ID:      Secondary (if applicable):  Insurance ID:      Pharmacy Information (if different than what is on RX)  Name:    Phone:    Fax:

## 2024-07-30 NOTE — TELEPHONE ENCOUNTER
Retail Pharmacy Prior Authorization Team   Phone: [phone]    PA Initiation    Medication: AJOVY 225 MG/1.5ML SC SOAJ  Insurance Company: [employer] - Phone [phone] Fax [phone]  Pharmacy Filling the Rx: [id number] - [place], [place] - [address]  Filling Pharmacy Phone: [phone]  Filling Pharmacy Fax:    Start Date: 7/30/2024

## 2024-08-01 ENCOUNTER — TELEPHONE (OUTPATIENT)
Dept: NEUROLOGY | Facility: CLINIC | Age: 74
End: 2024-08-01
Payer: MEDICARE

## 2024-08-01 DIAGNOSIS — G43.109 MIGRAINE WITH AURA AND WITHOUT STATUS MIGRAINOSUS, NOT INTRACTABLE: Primary | ICD-10-CM

## 2024-08-01 NOTE — TELEPHONE ENCOUNTER
Per denial reason: Emgality or nurtec preferred.     Please advise on ordering alternative.     Emily COMBS RN, BSN  [facility]

## 2024-08-01 NOTE — TELEPHONE ENCOUNTER
Prior Authorization Specialty Medication Request    Medication/Dose: galcanezumab-gnlm (EMGALITY) 240 MG/ML injection (Loading Dose); galcanezumab-gnlm (EMGALITY) 120 MG/ML injection (Maintenance Dose)   Diagnosis and ICD code (if different than what is on RX):    New/renewal/insurance change PA/secondary ins. PA:  Previously Tried and Failed:      Important Lab Values:   Rationale:     Insurance   Primary:   Insurance ID:      Secondary (if applicable):  Insurance ID:      Pharmacy Information (if different than what is on RX)  Name:    Phone:    Fax:        Primary Visit Coverage    Payer Plan Sponsor Code Group Number Group Name   MEDICARE MEDICARE [id number]       Primary Visit Coverage Subscriber    ID Name SSN Address   [id number] TRACIE VAUGHN [id number] [place] [address]     Secondary Visit Coverage    Payer Plan Sponsor Code Group Number Group Name   [facility] [id number]      Secondary Visit Coverage Subscriber    ID Name N Address   [id number] TRACIE VAUHGN [id number] [place] [address]

## 2024-08-01 NOTE — TELEPHONE ENCOUNTER
PRIOR AUTHORIZATION DENIED    Medication: AJOVY 225 MG/1.5ML SC SOAJ  Insurance Company: [employer] - Phone [phone] Fax [phone]  Denial Date: 7/31/2024  Denial Reason(s):           Appeal Information:         Patient Notified: No

## 2024-08-01 NOTE — TELEPHONE ENCOUNTER
Placed a prior auth for Emgality.  Will contact patient to make aware of change.  Prior auth for Emgality in separate encounter.    Steph Cadena MA       Called patient, advised that Emgality has been ordered by provider due to insurance denial of Ajovy and insurance preference. Patient understands and will wait to hear back from pharmacy on prescription.    Steph Cadena MA

## 2024-08-04 NOTE — TELEPHONE ENCOUNTER
PA Initiation    Medication: EMGALITY 120 MG/ML SC SOAJ  Insurance Company: [employer] - Phone [phone] Fax [phone]  Pharmacy Filling the Rx: [id number] - [place], [place] - [facility]  Filling Pharmacy Phone: [phone]  Filling Pharmacy Fax: [phone]  Start Date: 8/4/2024

## 2024-08-05 NOTE — TELEPHONE ENCOUNTER
Prior Authorization Approval    Medication: EMGALITY 120 MG/ML SC SOAJ  Authorization Effective Date: 5/6/2024  Authorization Expiration Date: 8/4/2025  Approved Dose/Quantity:   Reference #:     Insurance Company: [employer] - Phone [phone] Fax [phone]  Expected CoPay: $    CoPay Card Available:      Financial Assistance Needed:   Which Pharmacy is filling the prescription: [id number] - [place], [place] - [address]  Pharmacy Notified: YES  Patient Notified: **Instructed pharmacy to notify patient when script is ready to /ship.**

## 2024-08-09 ENCOUNTER — MYC MEDICAL ADVICE (OUTPATIENT)
Dept: NEUROLOGY | Facility: CLINIC | Age: 74
End: 2024-08-09
Payer: MEDICARE

## 2024-08-31 ENCOUNTER — HEALTH MAINTENANCE LETTER (OUTPATIENT)
Age: 74
End: 2024-08-31

## 2024-09-23 ENCOUNTER — TELEPHONE (OUTPATIENT)
Dept: NEUROLOGY | Facility: CLINIC | Age: 74
End: 2024-09-23
Payer: MEDICARE

## 2024-09-23 DIAGNOSIS — G43.109 MIGRAINE WITH AURA AND WITHOUT STATUS MIGRAINOSUS, NOT INTRACTABLE: ICD-10-CM

## 2024-09-23 NOTE — TELEPHONE ENCOUNTER
Called pharmacy and was unable to connect with a pharmacist. Sent to [facility] [employer] for prescription and medication questions/information. Left patient name, , and phone number. Advised that patient has refills for 1 year on file and the patient is in need for her next refill. Advised pharmacy to please contact patient and update her on prescription status when able.    Sending patient [employer] message now confirming pharmacy call.      Steph Cadena MA

## 2024-09-23 NOTE — TELEPHONE ENCOUNTER
[facility] Call Center    Phone Message    May a detailed message be left on voicemail: yes     Reason for Call: Medication Refill Request    Has the patient contacted the pharmacy for the refill? Yes   Name of medication being requested: galcanezumab-gnlm (EMGALITY) 120 MG/ML injection    Provider who prescribed the medication: Kathleen Horton  Pharmacy: [id number] [address] [facility] [address]    Date medication is needed: 09/23/2024    Pt stated she is not sure if a PA is requested.    Pt can be reached at: [phone]    Action Taken: Message routed to:  [facility] Neurology     Travel Screening: Not Applicable     Date of Service:

## 2024-09-23 NOTE — TELEPHONE ENCOUNTER
Called patient to advise on refills from provider. Patient states that she has spoken to pharmacy who has told her they had no refills on file for patient. Advised patient that the provider filled her script for the year, and she should be able to get them from the pharmacy. Patient requesting that our clinic contact the pharmacy to let them know this. Advised patient that I would give her pharmacy a call, and send a [email or web address] message once completed.     Patient understands and will wait to hear from our team on my chart.       Steph Cadena MA

## 2024-09-23 NOTE — TELEPHONE ENCOUNTER
Called pharmacy, left 2nd voicemail-unable to connect with pharmacist. Advised if any issues with patient medication refills to please contact our clinic. Provided clinic phone and address to reach back out to if needed.       [employer] sent to patient:  Hello,   Our clinic has reached out to your pharmacy twice to confirm medication refills on file and advise that script should be good for 1 year.      -Was unable to reach pharmacist as requested through automated phone system as none are available at this time due to store high volume. Voicemail was provided to leave patient information along with prescription information. Advised and left a message with patient information and medication. Was sure to notify pharmacy that provider sent in 1 years worth of refills. Provided our clinic contact information for further questions or concerns.  9/23/2024  2:40PM        If you have any other questions regarding medication please contact your pharmacy 1st to follow up on refill status.        Thanks!        [facility] Francy Cadena MA

## 2024-09-23 NOTE — TELEPHONE ENCOUNTER
Please see medication details in chart.  Pt should have refills on file at requested pharmacy:  galcanezumab-gnlm (EMGALITY) 120 MG/ML injection 1 mL 11 8/1/2024 -- No     Year supply sent 8/1/24- please call pharmacy and see if there is an issue dispensing.     There is a PA encounter 8/1/24.     Emily RN, BSN  [facility]

## 2024-09-23 NOTE — TELEPHONE ENCOUNTER
Resent script of emgality to requested pharmacy, since pharmacy cannot be reached via telephone.     Called and updated pt.  She said she already was notified that they are filling it now.     Emily RN, BSN  [facility]

## 2024-09-24 NOTE — TELEPHONE ENCOUNTER
[id number] - [place], [place] - [address]     PH: [phone]     Attempted to contact pharmacy to relay message below. Could not talk to pharmacist at this time.   Will need to contact them again.      Manuela Schmitz MA  [facility]

## 2024-09-24 NOTE — TELEPHONE ENCOUNTER
Called pt.     She did loading dose (2 injections) of Emgality 8/23/24.      So, she is now due for the maintenance dose of emgality (1 injection) now.     Her pharmacy said she could not fill it until 10/21/24.    RN attempted to call pharmacy and left voicemail, as they were closed for lunch hour currently.     LDVM asking them to rerun the script through insurance, as pt did loading dose last month and is now due for maintenance dose.     Emily RN, BSN  [facility]

## 2024-09-24 NOTE — TELEPHONE ENCOUNTER
[facility] Call Center    Phone Message    May a detailed message be left on voicemail: yes     Reason for Call: Rebecca calling to request a call back to discuss when her next injection is due. Linus stated that she had 2 injections of this medication and is inquiring if she should she take her next injection today? Rebecca's pharmacy is stating that they are unable to fill this medication until 10/21/24.    Marking as urgent due to time sensitivity to whether she should have her injection today.    Action Taken: Message routed to:  Other: [facility]    Travel Screening: Not Applicable     Date of Service:

## 2024-09-27 NOTE — TELEPHONE ENCOUNTER
Spoke with pharmacy and patient did  medication on 24th and should be good to go.    Manuela Schmitz MA  [facility]

## 2024-10-25 ASSESSMENT — MIGRAINE DISABILITY ASSESSMENT (MIDAS)
TOTAL SCORE: 12
HOW OFTEN WERE SOCIAL ACTIVITIES MISSED DUE TO HEADACHES: 2
HOW MANY DAYS WAS YOUR PRODUCTIVITY CUT IN HALF BECAUSE OF HEADACHES: 0
HOW MANY DAYS DID YOU NOT DO HOUSEWORK BECAUSE OF HEADACHES: 5
HOW MANY DAYS DID YOU MISS WORK OR SCHOOL BECAUSE OF HEADACHES: 0
HOW MANY DAYS WAS HOUSEWORK PRODUCTIVITY CUT IN HALF DUE TO HEADACHES: 5
HOW MANY DAYS IN THE PAST 3 MONTHS HAVE YOU HAD A HEADACHE: 10
ON A SCALE FROM 0-10 ON AVERAGE HOW PAINFUL WERE HEADACHES: 7

## 2024-10-25 ASSESSMENT — HEADACHE IMPACT TEST (HIT 6)
HOW OFTEN DO HEADACHES LIMIT YOUR DAILY ACTIVITIES: SOMETIMES
WHEN YOU HAVE HEADACHES HOW OFTEN IS THE PAIN SEVERE: SOMETIMES
HIT6 TOTAL SCORE: 63
WHEN YOU HAVE A HEADACHE HOW OFTEN DO YOU WISH YOU COULD LIE DOWN: SOMETIMES
HOW OFTEN HAVE YOU FELT TOO TIRED TO WORK BECAUSE OF YOUR HEADACHES: VERY OFTEN
HOW OFTEN DID HEADACHS LIMIT CONCENTRATION ON WORK OR DAILY ACTIVITY: VERY OFTEN
HOW OFTEN HAVE YOU FELT FED UP OR IRRITATED BECAUSE OF YOUR HEADACHES: VERY OFTEN

## 2024-10-30 ENCOUNTER — TELEPHONE (OUTPATIENT)
Dept: NEUROLOGY | Facility: CLINIC | Age: 74
End: 2024-10-30

## 2024-10-30 ENCOUNTER — VIRTUAL VISIT (OUTPATIENT)
Dept: NEUROLOGY | Facility: CLINIC | Age: 74
End: 2024-10-30
Payer: MEDICARE

## 2024-10-30 VITALS — BODY MASS INDEX: 19.45 KG/M2 | HEIGHT: 61 IN | WEIGHT: 103 LBS

## 2024-10-30 DIAGNOSIS — G43.109 MIGRAINE WITH AURA AND WITHOUT STATUS MIGRAINOSUS, NOT INTRACTABLE: ICD-10-CM

## 2024-10-30 PROCEDURE — G2211 COMPLEX E/M VISIT ADD ON: HCPCS | Mod: 95 | Performed by: PSYCHIATRY & NEUROLOGY

## 2024-10-30 PROCEDURE — 99214 OFFICE O/P EST MOD 30 MIN: CPT | Mod: 95 | Performed by: PSYCHIATRY & NEUROLOGY

## 2024-10-30 ASSESSMENT — PAIN SCALES - GENERAL: PAINLEVEL_OUTOF10: MODERATE PAIN (5)

## 2024-10-30 NOTE — TELEPHONE ENCOUNTER
Prior Authorization Not Needed per Insurance    Medication: SUMATRIPTAN SUCCINATE 6 MG/0.5ML SC SOLN  Insurance Company: [employer] - Phone [phone] Fax [phone]  Expected CoPay: $    Pharmacy Filling the Rx:    Pharmacy Notified: [facility]  Patient Notified: Yes

## 2024-10-30 NOTE — LETTER
10/30/2024       RE: Rebecca Wilson  [address]  [facility] [facility] [address]     Dear Colleague,    Thank you for referring your patient, Rebecca Wilson, to the [facility] [place] [facility]. Please see a copy of my visit note below.    Virtual Visit Details    Type of service:  Video Visit   Video Start Time: 10:56 AM  Video End Time:11:23 AM    Originating Location (pt. 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  I suspect she has this on a genetic basis. Previous head imaging, an MRI of the brain in 2021, was unrevealing.    Emgality was not effective. Ajovy is not covered by insurance. Will trial Aimovig next.     We discussed a symptomatic treatment strategy, with acute and preventative treatments.    -For acute treatment, she will continue sumatriptan 50 mg tablet at the onset of headache, with a repeat dose in 2 hours if needed.  This should not exceed more than 9 days/month to avoid medication overuse.  She is currently taking this more than 9 days/month, and while she appears to be tolerating this well, we discussed trialing newer preventative therapies to see if this would be useful in limiting her acute medication use.     Her migraine symptom frequency and severity warrant prevention.    -For preventative treatment, she has tried antidepressants, antiseizure, and antihypertensive medications.  She also trialed botulinum toxin injections cosmetically.  None of these treatments was effective.    -I recommended trial of Aimovig subcutaneous injection every 30 days.  Potential side effects were discussed.  I recommend a 3 to  "6-month trial prior to determining effectiveness.  -If Aimovig is not tolerated or not effective, the higher dose, an alternative CGRP inhibitor, or a trial of botulinum toxin injections using a chronic migraine protocol will be considered.    The longitudinal plan of care for Rebecca was addressed during this visit. Due to the added complexity in care, I will continue to support Rebecca in the subsequent management of this condition(s) and with the ongoing continuity of care of this condition(s).      Kathleen Horton MD  Neurology     Subjective:    Rebecca Wilson returns for follow up of chronic migraine.    Headaches are about the same. Continues to have blurred vision, followed by dull head pain and feeling wiped out.    She is affected 3-4 days per week currently, which is significantly affecting her functioning.    She tried Emgality. No side effects. No positive effect noted. She took the loading dose and an additional two, for a total of a 3 month trial.    She is taking sumatriptan tablets or injections. She previously received 5 boxes of injections at a time, now can only get one. These remain effective.    Objective:    Vitals: Ht 1.549 m (5' 0.98\")   Wt 46.7 kg (103 lb)   BMI 19.47 kg/m    General: Cooperative, NAD  Neurologic:  Mental Status: Fully alert, attentive and oriented. Speech clear and fluent.   Cranial Nerves: Facial movements symmetric.   Motor: No abnormal movements.      Pertinent Investigations:          7/21/2024     9:24 AM 10/25/2024     1:07 PM   HIT-6   When you have headaches, how often is the pain severe 8  10    How often do headaches limit your ability to do usual daily activities including household work, work, school, or social activities? 10  10    When you have a headache, how often do you wish you could lie down? 10  10    In the past 4 weeks, how often have you felt too tired to do work or daily activities because of your headaches 10  11    In the past 4 weeks, how " often have you felt fed up or irritated because of your headaches 10  11    In the past 4 weeks, how often did headaches limit your ability to concentrate on work or daily activities 10  11    HIT-6 Total Score 58 63        Patient-reported           7/25/2024     3:43 PM 10/25/2024     1:09 PM   MIDAS - in the past three months:   On how many days did you miss work or school because of your headaches? 18 0   How many days was your productivity at work or school reduced by half or more because of your headaches? 18 0   On how many days did you not do household work because of your headaches? 18 5   How many days was your productivity in household work reduced by half or more because of your headaches? 20 5   On how many days did you miss family, social, or leisure activities because of your headaches? 3 2   On how many days did you have a headache? 6 10   On a scale of 0-10, on average how painful were these headaches?  7   MIDAS Score 77 (IV - Severe Disability) 12 (III - Moderate Disability)          Again, thank you for allowing me to participate in the care of your patient.      Sincerely,    Kathleen Horton MD

## 2024-10-30 NOTE — TELEPHONE ENCOUNTER
Quantity exception needed?  Pt states she has only been able to  one injection per month.     Prior Authorization Retail Medication Request    Medication/Dose: SUMAtriptan (IMITREX) 6 MG/0.5ML injection  Diagnosis and ICD code (if different than what is on RX):    New/renewal/insurance change PA/secondary ins. PA:  Previously Tried and Failed:    Rationale:      Insurance   Primary: MEDICARE   Insurance ID:    [id number]     Pharmacy Information (if different than what is on RX)  Name:    Phone:    Fax:    Clinic Information  Preferred routing pool for dept communication:  NEUROLOGY CSS POOL [[id number]]

## 2024-10-30 NOTE — NURSING NOTE
Current patient location: [address] [facility] [address]    Is the patient currently in the state of [place]? YES    Visit mode:VIDEO    If the visit is dropped, the patient can be reconnected by: VIDEO VISIT: Text to cell phone:   Telephone Information:   Mobile [phone]       Will anyone else be joining the visit? NO  (If patient encounters technical issues they should call [phone] :[id number])    Are changes needed to the allergy or medication list? No    Are refills needed on medications prescribed by this physician? Discuss with provider    Rooming Documentation:  Questionnaire(s) completed    Reason for visit: Follow-up     Gerri BARBOSA

## 2024-10-30 NOTE — PROGRESS NOTES
Virtual Visit Details    Type of service:  Video Visit   Video Start Time: 10:56 AM  Video End Time:11:23 AM    Originating Location (pt. 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  I suspect she has this on a genetic basis. Previous head imaging, an MRI of the brain in 2021, was unrevealing.    Emgality was not effective. Ajovy is not covered by insurance. Will trial Aimovig next.     We discussed a symptomatic treatment strategy, with acute and preventative treatments.    -For acute treatment, she will continue sumatriptan 50 mg tablet at the onset of headache, with a repeat dose in 2 hours if needed.  This should not exceed more than 9 days/month to avoid medication overuse.  She is currently taking this more than 9 days/month, and while she appears to be tolerating this well, we discussed trialing newer preventative therapies to see if this would be useful in limiting her acute medication use.     Her migraine symptom frequency and severity warrant prevention.    -For preventative treatment, she has tried antidepressants, antiseizure, and antihypertensive medications.  She also trialed botulinum toxin injections cosmetically.  None of these treatments was effective.    -I recommended trial of Aimovig subcutaneous injection every 30 days.  Potential side effects were discussed.  I recommend a 3 to 6-month trial prior to determining effectiveness.  -If Aimovig is not tolerated or not effective, the higher dose, an alternative CGRP inhibitor, or a trial of botulinum toxin injections using a chronic migraine protocol will be considered.    The longitudinal plan of care for Rebecca was addressed during this visit. Due to  "the added complexity in care, I will continue to support Rebecca in the subsequent management of this condition(s) and with the ongoing continuity of care of this condition(s).      Kathleen Horton MD  Neurology     Subjective:    Rebecca Wilson returns for follow up of chronic migraine.    Headaches are about the same. Continues to have blurred vision, followed by dull head pain and feeling wiped out.    She is affected 3-4 days per week currently, which is significantly affecting her functioning.    She tried Emgality. No side effects. No positive effect noted. She took the loading dose and an additional two, for a total of a 3 month trial.    She is taking sumatriptan tablets or injections. She previously received 5 boxes of injections at a time, now can only get one. These remain effective.    Objective:    Vitals: Ht 1.549 m (5' 0.98\")   Wt 46.7 kg (103 lb)   BMI 19.47 kg/m    General: Cooperative, NAD  Neurologic:  Mental Status: Fully alert, attentive and oriented. Speech clear and fluent.   Cranial Nerves: Facial movements symmetric.   Motor: No abnormal movements.      Pertinent Investigations:          7/21/2024     9:24 AM 10/25/2024     1:07 PM   HIT-6   When you have headaches, how often is the pain severe 8  10    How often do headaches limit your ability to do usual daily activities including household work, work, school, or social activities? 10  10    When you have a headache, how often do you wish you could lie down? 10  10    In the past 4 weeks, how often have you felt too tired to do work or daily activities because of your headaches 10  11    In the past 4 weeks, how often have you felt fed up or irritated because of your headaches 10  11    In the past 4 weeks, how often did headaches limit your ability to concentrate on work or daily activities 10  11    HIT-6 Total Score 58 63        Patient-reported           7/25/2024     3:43 PM 10/25/2024     1:09 PM   MIDAS - in the past three " months:   On how many days did you miss work or school because of your headaches? 18 0   How many days was your productivity at work or school reduced by half or more because of your headaches? 18 0   On how many days did you not do household work because of your headaches? 18 5   How many days was your productivity in household work reduced by half or more because of your headaches? 20 5   On how many days did you miss family, social, or leisure activities because of your headaches? 3 2   On how many days did you have a headache? 6 10   On a scale of 0-10, on average how painful were these headaches?  7   MIDAS Score 77 (IV - Severe Disability) 12 (III - Moderate Disability)

## 2024-10-31 ENCOUNTER — TELEPHONE (OUTPATIENT)
Dept: NEUROLOGY | Facility: CLINIC | Age: 74
End: 2024-10-31
Payer: MEDICARE

## 2024-10-31 NOTE — TELEPHONE ENCOUNTER
Per PA team:  So this is already covered under her insurance but her insurance only allows #18 per 30 days of any Sumatriptan Medication. So if the patient is filling the tablets as well as the injectable they will bounce off of each other. She can fill less tablets to get more injectables if she would like.     Routing to provider for awareness.     Please advise if quantity should be changed for either med?  Emily RN, BSN  [facility]

## 2024-10-31 NOTE — TELEPHONE ENCOUNTER
Prior Authorization Retail Medication Request    Medication/Dose: erenumab-aooe (AIMOVIG) 70 MG/ML injection.   Diagnosis and ICD code (if different than what is on RX):    New/renewal/insurance change PA/secondary ins. PA:  Previously Tried and Failed:    Rationale:     Insurance   Primary: MEDICARE - MEDICARE   Insurance ID:  [id number]      Secondary (if applicable):BCBS - [employer] [place] MEDICARE SUPPLEMENT   Insurance ID:  [id number]      Pharmacy Information (if different than what is on RX)  Name:    Phone:    Fax:    Clinic Information  Preferred routing pool for dept communication:  Neurology [facility] Pool

## 2024-10-31 NOTE — TELEPHONE ENCOUNTER
Relayed information about Sumatriptan to patient via [employer].    Prema BARNES RN, BSN  [facility]

## 2024-11-01 NOTE — TELEPHONE ENCOUNTER
PA Initiation    Medication: ERENUMAB-AOOE 70 MG/ML SC SOAJ  Insurance Company: [employer] - Phone [phone] Fax [phone]  Ph[id number] - [place], [place] - [address]  Filling Pharmacy Phone: [phone]  Filling Pharmacy Fax: [phone]  Start Date: 11/1/2024

## 2024-11-05 DIAGNOSIS — G43.109 MIGRAINE WITH AURA AND WITHOUT STATUS MIGRAINOSUS, NOT INTRACTABLE: Primary | ICD-10-CM

## 2024-11-05 NOTE — TELEPHONE ENCOUNTER
MyChart sent patient - inquiring about Botox injections for migraine prevention. MyChart sent back with option of Nurtec as listed on denial or proceed with Botox. Waiting response.

## 2024-11-05 NOTE — TELEPHONE ENCOUNTER
PRIOR AUTHORIZATION DENIED    Medication: ERENUMAB-AOOE 70 MG/ML SC SOAJ  Insurance Company: [employer] - Phone [phone] Fax [phone]  Denial Date: 11/2/2024  Denial Reason(s):     Appeal Information:     Patient Notified: No, care team must notify on denials.

## 2024-11-05 NOTE — TELEPHONE ENCOUNTER
Modified Advancement Flap Text: The defect edges were debeveled with a #15 scalpel blade.  Given the location of the defect, shape of the defect and the proximity to free margins a modified advancement flap was deemed most appropriate.  Using a sterile surgical marker, an appropriate advancement flap was drawn incorporating the defect and placing the expected incisions within the relaxed skin tension lines where possible.    The area thus outlined was incised deep to adipose tissue with a #15 scalpel blade.  The skin margins were undermined to an appropriate distance in all directions utilizing iris scissors. Patient requested phone call to discuss. Questions answered. She is willing to do whatever Dr. Horton believes is the best treatment for her, either Nurtec every other day or Botox injections. Will call back with recommendations.

## 2024-11-06 NOTE — TELEPHONE ENCOUNTER
LVM relaying update of [facility]. EPA approved. Clinic # provided if there are further questions.

## 2025-02-03 ASSESSMENT — HEADACHE IMPACT TEST (HIT 6)
WHEN YOU HAVE A HEADACHE HOW OFTEN DO YOU WISH YOU COULD LIE DOWN: SOMETIMES
HOW OFTEN HAVE YOU FELT TOO TIRED TO WORK BECAUSE OF YOUR HEADACHES: SOMETIMES
HOW OFTEN DID HEADACHS LIMIT CONCENTRATION ON WORK OR DAILY ACTIVITY: SOMETIMES
HOW OFTEN DO HEADACHES LIMIT YOUR DAILY ACTIVITIES: SOMETIMES
HOW OFTEN HAVE YOU FELT FED UP OR IRRITATED BECAUSE OF YOUR HEADACHES: SOMETIMES
HIT6 TOTAL SCORE: 60
WHEN YOU HAVE HEADACHES HOW OFTEN IS THE PAIN SEVERE: SOMETIMES

## 2025-02-03 ASSESSMENT — MIGRAINE DISABILITY ASSESSMENT (MIDAS)
HOW OFTEN WERE SOCIAL ACTIVITIES MISSED DUE TO HEADACHES: 3
HOW MANY DAYS DID YOU MISS WORK OR SCHOOL BECAUSE OF HEADACHES: 6
TOTAL SCORE: 24
HOW MANY DAYS IN THE PAST 3 MONTHS HAVE YOU HAD A HEADACHE: 6
ON A SCALE FROM 0-10 ON AVERAGE HOW PAINFUL WERE HEADACHES: 8
HOW MANY DAYS DID YOU NOT DO HOUSEWORK BECAUSE OF HEADACHES: 3
HOW MANY DAYS WAS YOUR PRODUCTIVITY CUT IN HALF BECAUSE OF HEADACHES: 6
HOW MANY DAYS WAS HOUSEWORK PRODUCTIVITY CUT IN HALF DUE TO HEADACHES: 6

## 2025-02-05 ENCOUNTER — VIRTUAL VISIT (OUTPATIENT)
Dept: NEUROLOGY | Facility: CLINIC | Age: 75
End: 2025-02-05
Payer: MEDICARE

## 2025-02-05 VITALS — BODY MASS INDEX: 20.62 KG/M2 | WEIGHT: 105 LBS | HEIGHT: 60 IN

## 2025-02-05 DIAGNOSIS — G43.109 MIGRAINE WITH AURA AND WITHOUT STATUS MIGRAINOSUS, NOT INTRACTABLE: ICD-10-CM

## 2025-02-05 DIAGNOSIS — G43.709 CHRONIC MIGRAINE WITHOUT AURA WITHOUT STATUS MIGRAINOSUS, NOT INTRACTABLE: Primary | ICD-10-CM

## 2025-02-05 RX ORDER — SUMATRIPTAN 6 MG/.5ML
INJECTION, SOLUTION SUBCUTANEOUS
Qty: 10 ML | Refills: 11 | Status: SHIPPED | OUTPATIENT
Start: 2025-02-05

## 2025-02-05 RX ORDER — SUMATRIPTAN 50 MG/1
100 TABLET, FILM COATED ORAL
Qty: 30 TABLET | Refills: 11 | Status: SHIPPED | OUTPATIENT
Start: 2025-02-05

## 2025-02-05 ASSESSMENT — PAIN SCALES - GENERAL: PAINLEVEL_OUTOF10: NO PAIN (0)

## 2025-02-05 NOTE — LETTER
2/5/2025       RE: Rebecca Wilson  [address]  [facility] [address]     Dear Colleague,    Thank you for referring your patient, Rebecca Wilson, to the [facility] [place] at [facility]. Please see a copy of my visit note below.    Virtual Visit Details    Type of service:  Video Visit   Video Start Time:  8:30 AM  Video End Time: 8:41 AM    Originating Location (pt. 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  I suspect she has this on a genetic basis. Previous head imaging, an MRI of the brain in 2021, was unrevealing for structural causes.     Chronic migraine continues, not yet controlled. She is having less visual aura since starting Nurtec every other day last month, but not a significant reduction in symptoms.     -Emgality was not effective. Ajovy, Aimovig were denied by insurance.   -For preventative treatment, she has tried antidepressants, antiseizure, and antihypertensive medications.  She also trialed botulinum toxin injections cosmetically.  None of these treatments was effective.    -If needed in the future, an alternative CGRP inhibitor, or a trial of botulinum toxin injections using a chronic migraine protocol will be considered.  -I recommend a trial of Botox injections every 12 weeks using a chronic migraine protocol. We will attempt to get prior authorization.     We discussed a symptomatic treatment strategy, with acute and preventative treatments.    -For acute treatment, she will continue sumatriptan 50 mg tablet at the onset of headache, with a repeat dose in 2 hours if needed.  This should not exceed more  than 9 days/month to avoid medication overuse.    -Occasionally, she uses the sumatriptan subcutaneous injections instead.     The longitudinal plan of care for Rebecca was addressed during this visit. Due to the added complexity in care, I will continue to support Rebecca in the subsequent management of this condition(s) and with the ongoing continuity of care of this condition(s). I will see her back in 1 month for Botox injections.    Kathleen Horton MD  Neurology     Subjective:    Rebecca Wilson returns for follow up of chronic migraine.    At last visit in October, I recommended Aimovig, but this was not covered by her insurance plan. Nurtec was ordered instead.    She is taking Nurtec 75 mg every other day; she paid $900 for this. She started this in January, last month. She is not sure if it is helpful. Still gets migraine symptoms, with left cheek sensory aura, but less blurred vision. No side effects with Nurtec; no nausea.    She takes sumatriptan 50 mg or sumatriptan injection as needed. These remain effective.     Headaches remain frequent and severe. Migraine days affecting function occur about 10 days per month.    Objective:    Vitals: Ht 1.524 m (5')   Wt 47.6 kg (105 lb)   BMI 20.51 kg/m    General: Cooperative, NAD  Neurologic:  Mental Status: Fully alert, attentive and oriented. Speech clear and fluent.   Cranial Nerves: Facial movements symmetric.   Motor: No abnormal movements.      Pertinent Investigations:          7/21/2024     9:24 AM 10/25/2024     1:07 PM 2/3/2025     1:50 PM   HIT-6   When you have headaches, how often is the pain severe 8 10 10   How often do headaches limit your ability to do usual daily activities including household work, work, school, or social activities? 10 10 10   When you have a headache, how often do you wish you could lie down? 10 10 10   In the past 4 weeks, how often have you felt too tired to do work or daily activities because of your headaches 10 11  10   In the past 4 weeks, how often have you felt fed up or irritated because of your headaches 10 11 10   In the past 4 weeks, how often did headaches limit your ability to concentrate on work or daily activities 10 11 10   HIT-6 Total Score 58 63  60        Patient-reported           7/25/2024     3:43 PM 10/25/2024     1:09 PM 2/3/2025     1:53 PM   MIDAS - in the past three months:   On how many days did you miss work or school because of your headaches? 18 0 6   How many days was your productivity at work or school reduced by half or more because of your headaches? 18 0 6   On how many days did you not do household work because of your headaches? 18 5 3   How many days was your productivity in household work reduced by half or more because of your headaches? 20 5 6   On how many days did you miss family, social, or leisure activities because of your headaches? 3 2 3   On how many days did you have a headache? 6 10 6   On a scale of 0-10, on average how painful were these headaches?  7 8   MIDAS Score 77 (IV - Severe Disability) 12 (III - Moderate Disability) 24 (IV - Severe Disability)          Again, thank you for allowing me to participate in the care of your patient.      Sincerely,    Kathleen Horton MD

## 2025-02-05 NOTE — PROGRESS NOTES
Virtual Visit Details    Type of service:  Video Visit   Video Start Time:  8:30 AM  Video End Time: 8:41 AM    Originating Location (pt. 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  I suspect she has this on a genetic basis. Previous head imaging, an MRI of the brain in 2021, was unrevealing for structural causes.     Chronic migraine continues, not yet controlled. She is having less visual aura since starting Nurtec every other day last month, but not a significant reduction in symptoms.     -Emgality was not effective. Ajovy, Aimovig were denied by insurance.   -For preventative treatment, she has tried antidepressants, antiseizure, and antihypertensive medications.  She also trialed botulinum toxin injections cosmetically.  None of these treatments was effective.    -If needed in the future, an alternative CGRP inhibitor, or a trial of botulinum toxin injections using a chronic migraine protocol will be considered.  -I recommend a trial of Botox injections every 12 weeks using a chronic migraine protocol. We will attempt to get prior authorization.     We discussed a symptomatic treatment strategy, with acute and preventative treatments.    -For acute treatment, she will continue sumatriptan 50 mg tablet at the onset of headache, with a repeat dose in 2 hours if needed.  This should not exceed more than 9 days/month to avoid medication overuse.    -Occasionally, she uses the sumatriptan subcutaneous injections instead.     The longitudinal plan of care for Rebecca was addressed during this visit. Due to the added complexity in care, I will continue to support Rebecca in the subsequent management of this condition(s)  and with the ongoing continuity of care of this condition(s). I will see her back in 1 month for Botox injections.    Kathleen Horton MD  Neurology     Subjective:    Rebecca Wilson returns for follow up of chronic migraine.    At last visit in October, I recommended Aimovig, but this was not covered by her insurance plan. Nurtec was ordered instead.    She is taking Nurtec 75 mg every other day; she paid $900 for this. She started this in January, last month. She is not sure if it is helpful. Still gets migraine symptoms, with left cheek sensory aura, but less blurred vision. No side effects with Nurtec; no nausea.    She takes sumatriptan 50 mg or sumatriptan injection as needed. These remain effective.     Headaches remain frequent and severe. Migraine days affecting function occur about 10 days per month.    Objective:    Vitals: Ht 1.524 m (5')   Wt 47.6 kg (105 lb)   BMI 20.51 kg/m    General: Cooperative, NAD  Neurologic:  Mental Status: Fully alert, attentive and oriented. Speech clear and fluent.   Cranial Nerves: Facial movements symmetric.   Motor: No abnormal movements.      Pertinent Investigations:          7/21/2024     9:24 AM 10/25/2024     1:07 PM 2/3/2025     1:50 PM   HIT-6   When you have headaches, how often is the pain severe 8 10 10   How often do headaches limit your ability to do usual daily activities including household work, work, school, or social activities? 10 10 10   When you have a headache, how often do you wish you could lie down? 10 10 10   In the past 4 weeks, how often have you felt too tired to do work or daily activities because of your headaches 10 11 10   In the past 4 weeks, how often have you felt fed up or irritated because of your headaches 10 11 10   In the past 4 weeks, how often did headaches limit your ability to concentrate on work or daily activities 10 11 10   HIT-6 Total Score 58 63  60        Patient-reported           7/25/2024     3:43 PM 10/25/2024      1:09 PM 2/3/2025     1:53 PM   MIDAS - in the past three months:   On how many days did you miss work or school because of your headaches? 18 0 6   How many days was your productivity at work or school reduced by half or more because of your headaches? 18 0 6   On how many days did you not do household work because of your headaches? 18 5 3   How many days was your productivity in household work reduced by half or more because of your headaches? 20 5 6   On how many days did you miss family, social, or leisure activities because of your headaches? 3 2 3   On how many days did you have a headache? 6 10 6   On a scale of 0-10, on average how painful were these headaches?  7 8   MIDAS Score 77 (IV - Severe Disability) 12 (III - Moderate Disability) 24 (IV - Severe Disability)

## 2025-02-05 NOTE — NURSING NOTE
Current patient location: [address] [facility] [address]    Is the patient currently in the state of [place]? YES    Visit mode: VIDEO    If the visit is dropped, the patient can be reconnected by:VIDEO VISIT: Text to cell phone:   Telephone Information:   Mobile [phone]       Will anyone else be joining the visit? NO  (If patient encounters technical issues they should call [phone]    Are changes needed to the allergy or medication list? No    Are refills needed on medications prescribed by this physician? NO    Rooming Documentation:  Questionnaire(s) completed    Reason for visit: RECHECK    Alice RITTERF

## 2025-02-06 ENCOUNTER — TELEPHONE (OUTPATIENT)
Dept: NEUROLOGY | Facility: CLINIC | Age: 75
End: 2025-02-06
Payer: MEDICARE

## 2025-02-06 NOTE — TELEPHONE ENCOUNTER
Left Voicemail (1st Attempt) and Sent Mychart (1st Attempt) for the patient to call back and schedule the following:    Appointment type: [facility] Botox-Standard Migraine  Provider: Clifford  Return date: around 3/5/25  Specialty phone number: [phone]  Additional appointment(s) needed: NA  Additonal Notes: See below    Return in about 4 weeks (around 3/5/2025), for Botox appt with Clifford in [place].    Sheila Mcarthur on 2/6/2025 at 4:38 PM

## 2025-02-10 ENCOUNTER — TELEPHONE (OUTPATIENT)
Dept: NEUROLOGY | Facility: CLINIC | Age: 75
End: 2025-02-10
Payer: MEDICARE

## 2025-02-10 DIAGNOSIS — G43.109 MIGRAINE WITH AURA AND WITHOUT STATUS MIGRAINOSUS, NOT INTRACTABLE: ICD-10-CM

## 2025-02-10 NOTE — TELEPHONE ENCOUNTER
Left Voicemail (1st Attempt) and Sent Mychart (1st Attempt) for the patient to call back and schedule the following:    Appointment type: [facility] Botox  Provider: Clifford  Return date: around 3/5/25  Specialty phone number: [phone]  Additional appointment(s) needed: NA  Additonal Notes: Schedule with Clifford in [place].     Sheila Mcarthur on 2/10/2025 at 4:08 PM

## 2025-02-10 NOTE — TELEPHONE ENCOUNTER
[facility] Call Center    Phone Message    May a detailed message be left on voicemail: yes     Reason for Call: Medication Question or concern regarding medication   Prescription Clarification  Name of Medication: SUMAtriptan (IMITREX) 6 MG/0.5ML injection   Prescribing Provider: Clifford    Pharmacy: [id number] - [place], [place] - [address]    What on the order needs clarification? Patient called ad stated that she picked up her prescription from the pharmacy and they gave her the wrong needles for it. Patient stated the pharmacy cancelled the order and stated it needed to be written for auto inject. Please resend prescription.       Action Taken: Message routed to:  Other: Neurology    Travel Screening: Not Applicable     Date of Service:

## 2025-02-11 RX ORDER — CEFUROXIME AXETIL 250 MG/1
6 TABLET ORAL
Qty: 10 KIT | Refills: 11 | Status: SHIPPED | OUTPATIENT
Start: 2025-02-11

## 2025-02-11 NOTE — TELEPHONE ENCOUNTER
Pended the sumatriptan auto injector kit for provider review and approval.     Per pt, pharmacy requesting new order for the auto injection.    Emily RN, BSN  [facility] Neurology

## 2025-02-21 ENCOUNTER — TELEPHONE (OUTPATIENT)
Dept: NEUROLOGY | Facility: CLINIC | Age: 75
End: 2025-02-21
Payer: MEDICARE

## 2025-02-21 NOTE — TELEPHONE ENCOUNTER
Prior Authorization Retail Medication Request    Medication/Dose: Atogepant (QULIPTA) 60 MG tablet  Diagnosis and ICD code (if different than what is on RX):    New/renewal/insurance change PA/secondary ins. PA:  Previously Tried and Failed:    Rationale:      Insurance   Primary: MEDICARE [9] (Ph: [phone]) - MEDICARE [950] (Ph: [phone])  Insurance ID:  [id number]      Pharmacy Information (if different than what is on RX)  Name:    Phone:    Fax:    Clinic Information  Preferred routing pool for dept communication:  NEUROLOGY CSS POOL [[id number]]

## 2025-02-25 NOTE — TELEPHONE ENCOUNTER
Central Prior Authorization Team   Phone: [phone]    PA Initiation    Medication: Atogepant (QULIPTA) 60 MG tablet  Insurance Company: "[employer] - Phone [phone] Fax [phone]  Pharmacy Filling the Rx: [id number] - [place], [place] - [address]  Filling Pharmacy Phone: [phone]  Filling Pharmacy Fax:    Start Date: 2/25/2025

## 2025-02-28 NOTE — TELEPHONE ENCOUNTER
Prior Authorization Approval    Authorization Effective Date: 1/1/2025  Authorization Expiration Date: 2/26/2026  Medication: Atogepant (QULIPTA) 60 MG tablet-PA APPROVED   Approved Dose/Quantity:   Reference #:     Insurance Company: [employer] - Phone [phone] Fax [phone]  Expected CoPay:       CoPay Card Available:      Foundation Assistance Needed:    Which Pharmacy is filling the prescription (Not needed for infusion/clinic administered): [id number] - [place], [place] - [address] [facility]  Pharmacy Notified:  Yes- **Instructed pharmacy to notify patient when script is ready to /ship.**  Patient Notified:  Yes

## 2025-03-02 ENCOUNTER — HEALTH MAINTENANCE LETTER (OUTPATIENT)
Age: 75
End: 2025-03-02

## 2025-08-05 DIAGNOSIS — G43.109 MIGRAINE WITH AURA AND WITHOUT STATUS MIGRAINOSUS, NOT INTRACTABLE: ICD-10-CM

## 2025-08-05 RX ORDER — CEFUROXIME AXETIL 250 MG/1
6 TABLET ORAL
Qty: 10 KIT | Refills: 11 | Status: SHIPPED | OUTPATIENT
Start: 2025-08-05

## 2025-09-02 DIAGNOSIS — G43.109 MIGRAINE WITH AURA AND WITHOUT STATUS MIGRAINOSUS, NOT INTRACTABLE: ICD-10-CM

## 2025-09-02 RX ORDER — SUMATRIPTAN 50 MG/1
100 TABLET, FILM COATED ORAL
Qty: 30 TABLET | Refills: 11 | Status: SHIPPED | OUTPATIENT
Start: 2025-09-02